# Patient Record
Sex: MALE | Race: BLACK OR AFRICAN AMERICAN | NOT HISPANIC OR LATINO | URBAN - METROPOLITAN AREA
[De-identification: names, ages, dates, MRNs, and addresses within clinical notes are randomized per-mention and may not be internally consistent; named-entity substitution may affect disease eponyms.]

---

## 2018-02-21 ENCOUNTER — AMBULATORY - HEALTHEAST (OUTPATIENT)
Dept: CARDIAC REHAB | Facility: CLINIC | Age: 58
End: 2018-02-21

## 2018-02-21 DIAGNOSIS — Z95.5 S/P CORONARY ARTERY STENT PLACEMENT: ICD-10-CM

## 2018-02-22 ENCOUNTER — AMBULATORY - HEALTHEAST (OUTPATIENT)
Dept: CARDIAC REHAB | Facility: CLINIC | Age: 58
End: 2018-02-22

## 2018-02-22 DIAGNOSIS — Z95.5 S/P CORONARY ARTERY STENT PLACEMENT: ICD-10-CM

## 2018-02-22 RX ORDER — DOXEPIN HYDROCHLORIDE 10 MG/1
10 CAPSULE ORAL AT BEDTIME
Status: SHIPPED | COMMUNITY
Start: 2018-02-22

## 2018-02-22 RX ORDER — HYDROXYZINE HYDROCHLORIDE 25 MG/1
25 TABLET, FILM COATED ORAL 3 TIMES DAILY PRN
Status: SHIPPED | COMMUNITY
Start: 2018-02-22

## 2018-02-22 RX ORDER — ATORVASTATIN CALCIUM 40 MG/1
40 TABLET, FILM COATED ORAL AT BEDTIME
Status: SHIPPED | COMMUNITY
Start: 2018-02-22

## 2018-02-22 RX ORDER — NICOTINE 21 MG/24HR
1 PATCH, TRANSDERMAL 24 HOURS TRANSDERMAL EVERY 24 HOURS
Status: SHIPPED | COMMUNITY
Start: 2018-02-22

## 2018-02-22 RX ORDER — METOPROLOL SUCCINATE 25 MG/1
25 TABLET, EXTENDED RELEASE ORAL DAILY
Status: SHIPPED | COMMUNITY
Start: 2018-02-22

## 2018-02-22 RX ORDER — NITROGLYCERIN 80 MG/1
1 PATCH TRANSDERMAL DAILY
Status: SHIPPED | COMMUNITY
Start: 2018-02-22

## 2018-02-22 RX ORDER — SULFACETAMIDE SODIUM 100 MG/ML
1 SOLUTION/ DROPS OPHTHALMIC
Status: SHIPPED | COMMUNITY
Start: 2018-02-22

## 2018-02-22 RX ORDER — SERTRALINE HYDROCHLORIDE 100 MG/1
100 TABLET, FILM COATED ORAL DAILY
Status: SHIPPED | COMMUNITY
Start: 2018-02-22

## 2018-02-22 RX ORDER — POLYETHYLENE GLYCOL 3350 17 G
2 POWDER IN PACKET (EA) ORAL PRN
Status: SHIPPED | COMMUNITY
Start: 2018-02-22

## 2018-02-22 RX ORDER — ASPIRIN 81 MG/1
81 TABLET, CHEWABLE ORAL DAILY
Status: SHIPPED | COMMUNITY
Start: 2018-02-22

## 2018-02-22 RX ORDER — RISPERIDONE 4 MG/1
4 TABLET ORAL 2 TIMES DAILY
Status: SHIPPED | COMMUNITY
Start: 2018-02-22

## 2018-02-26 ENCOUNTER — AMBULATORY - HEALTHEAST (OUTPATIENT)
Dept: CARDIAC REHAB | Facility: CLINIC | Age: 58
End: 2018-02-26

## 2018-02-26 DIAGNOSIS — Z95.5 S/P CORONARY ARTERY STENT PLACEMENT: ICD-10-CM

## 2018-03-02 ENCOUNTER — AMBULATORY - HEALTHEAST (OUTPATIENT)
Dept: CARDIAC REHAB | Facility: CLINIC | Age: 58
End: 2018-03-02

## 2018-03-02 DIAGNOSIS — Z95.5 S/P CORONARY ARTERY STENT PLACEMENT: ICD-10-CM

## 2018-03-05 ENCOUNTER — AMBULATORY - HEALTHEAST (OUTPATIENT)
Dept: CARDIAC REHAB | Facility: CLINIC | Age: 58
End: 2018-03-05

## 2018-03-05 ENCOUNTER — COMMUNICATION - HEALTHEAST (OUTPATIENT)
Dept: TELEHEALTH | Facility: CLINIC | Age: 58
End: 2018-03-05

## 2018-03-05 DIAGNOSIS — Z95.5 S/P CORONARY ARTERY STENT PLACEMENT: ICD-10-CM

## 2018-03-05 DIAGNOSIS — I21.4 NSTEMI (NON-ST ELEVATED MYOCARDIAL INFARCTION) (H): ICD-10-CM

## 2018-03-07 ENCOUNTER — AMBULATORY - HEALTHEAST (OUTPATIENT)
Dept: CARDIAC REHAB | Facility: CLINIC | Age: 58
End: 2018-03-07

## 2018-03-07 DIAGNOSIS — Z95.5 S/P CORONARY ARTERY STENT PLACEMENT: ICD-10-CM

## 2018-03-07 DIAGNOSIS — I21.4 NSTEMI (NON-ST ELEVATED MYOCARDIAL INFARCTION) (H): ICD-10-CM

## 2018-03-19 ENCOUNTER — AMBULATORY - HEALTHEAST (OUTPATIENT)
Dept: CARDIAC REHAB | Facility: CLINIC | Age: 58
End: 2018-03-19

## 2018-03-19 DIAGNOSIS — Z95.5 S/P CORONARY ARTERY STENT PLACEMENT: ICD-10-CM

## 2018-03-19 DIAGNOSIS — I21.4 NSTEMI (NON-ST ELEVATED MYOCARDIAL INFARCTION) (H): ICD-10-CM

## 2018-03-21 ENCOUNTER — AMBULATORY - HEALTHEAST (OUTPATIENT)
Dept: CARDIAC REHAB | Facility: CLINIC | Age: 58
End: 2018-03-21

## 2018-03-21 DIAGNOSIS — I21.4 NSTEMI (NON-ST ELEVATED MYOCARDIAL INFARCTION) (H): ICD-10-CM

## 2018-03-21 DIAGNOSIS — Z95.5 S/P CORONARY ARTERY STENT PLACEMENT: ICD-10-CM

## 2018-03-26 ENCOUNTER — AMBULATORY - HEALTHEAST (OUTPATIENT)
Dept: CARDIAC REHAB | Facility: CLINIC | Age: 58
End: 2018-03-26

## 2018-03-26 DIAGNOSIS — I21.4 NSTEMI (NON-ST ELEVATED MYOCARDIAL INFARCTION) (H): ICD-10-CM

## 2018-04-30 ENCOUNTER — AMBULATORY - HEALTHEAST (OUTPATIENT)
Dept: CARDIAC REHAB | Facility: CLINIC | Age: 58
End: 2018-04-30

## 2018-04-30 DIAGNOSIS — Z95.5 S/P CORONARY ARTERY STENT PLACEMENT: ICD-10-CM

## 2018-04-30 DIAGNOSIS — I21.4 NSTEMI (NON-ST ELEVATED MYOCARDIAL INFARCTION) (H): ICD-10-CM

## 2018-05-07 ENCOUNTER — AMBULATORY - HEALTHEAST (OUTPATIENT)
Dept: CARDIAC REHAB | Facility: CLINIC | Age: 58
End: 2018-05-07

## 2018-05-07 DIAGNOSIS — I21.4 NSTEMI (NON-ST ELEVATED MYOCARDIAL INFARCTION) (H): ICD-10-CM

## 2018-05-07 LAB — GLUCOSE BLDC GLUCOMTR-MCNC: 89 MG/DL

## 2021-05-31 VITALS — WEIGHT: 181.4 LBS

## 2021-05-31 VITALS — WEIGHT: 185 LBS

## 2021-05-31 VITALS — WEIGHT: 180 LBS

## 2021-05-31 VITALS — WEIGHT: 182 LBS

## 2021-05-31 VITALS — WEIGHT: 177 LBS

## 2021-06-01 VITALS — WEIGHT: 184 LBS

## 2021-06-01 VITALS — WEIGHT: 185 LBS

## 2021-06-01 VITALS — WEIGHT: 181.8 LBS

## 2021-06-01 VITALS — WEIGHT: 183 LBS

## 2021-06-16 NOTE — PROGRESS NOTES
"Cardiac Rehab  Phase II Assessment    Assessment Date: 2/22/2018    Diagnosis: NSTEMI   Date of Onset: 2/2/2018  Procedure: NEREIDA to LAD  Date of Onset: 2/2/2018  ICD/Pacemaker: No  Post-op Complications: denies  ECG History: SR EF%:60%  Past Medical History: paranoid schizophrenia, anxiety, severe recurrent major depressive disorder with psychotic features, GERD, chronic chest pain for the past few months, low back pain, cervical spine pain, asthma (mild and persistent),COPD    Physical Assessment  Precautions/ Physical Limitations: back is bothersome at times, sometimes hips and knees, legs cramp up at night some times  Oxygen: No  O2 Sats: 98% Lung Sounds: inspiratory and expiratory wheezes, lungs clear per Britt Posey RN Edema: none  Incisions: well healed  Sleeping Pattern: fair , \"Sometimes I stay up so that's not good.  My thoughts race and I can't get to sleep.\"  Does report getting at least 4-6 hours consecutive sleep.  Reports typically takes meds at 9pm, goes to sleep at 9:30 and is up at 5 am.  Appetite: fair  Asking about eating cheeseburger.  Wants to find a chip he can eat.  KENNETH Jeffrey, was present and did discuss these specific concerns with the patient.  Nutrition Risk Screen: none    Pain  Location: denies any pain today.  Does report back is the typically the worst.  Characteristics:achy, sore  Current Pain Management: rest and ibuprofen  Intervention: as above  Response: relief    Psychosocial/ Emotional Health  1. In the past 12 months, have you been in a relationship where you have been abused physically, emotionally, sexually or financially? No  notified: NA  2. Who do you turn to for emotional support?: My brother.  3. Do you have cultural or spiritual needs? No  \"You ask a lot of questions.\"  4. Have there been any major life changes in the past 12 months? No    Referral Information  Primary Physician: Chantel James MD  Cardiologist: Is not sure who his cardiologist is.  " "Leigh Barrios MD  Surgeon: N/A    Home exercise/Equipment: No equipment at home. \"I would like to get something though.  Like one of those cycle bikes or arm weights.\"    Patient's long-term goal(s): 1.Getting stronger.                                                   2. Getting able to go outside to take walks, feeling up to doing it    1. Living Accommodations: Home Steps: Yes  (7-8 into building)      Support people at home: none, does have PCA 4 hours per day.  Assists with bathing, meal prep, cleaning   2. Marital Status:   3. Family is not able to assist with cares- most of family lives in [place].  Brother does live nearby but does not assist in cares.      Yazidi/Community involvement: \"Good-you ask a lot of questions.\"  4. Recreation/Hobbies: Playing poker on his phone, wants to go fishing I havent' been fishing in 10 years, watching tv, visiting brother and playing dominoes.          "
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
## 2021-06-16 NOTE — PROGRESS NOTES
ITP ASSESSMENT   Assessment Day: Initial  Session Number: 1 of 18-36  Precautions: standard cardiac, schizophrenia, severe depressive disorder with psychotic features  Diagnosis: MI;Stent  Risk Stratification: High (based on depression, smoking)  Referring Provider: Leigh Barrios,   EXERCISE  Exercise Assessment: Initial     6 Minute Walk Test   Pre   Pre Exercise HR: 62                  Pre Exercise BP: 107/65    Peak  Peak HR: 77                 Peak BP: 142/69  Peak feet: 1000  Peak O2 SAT: 98  Peak RPE: 13  Peak MPH: 1.89    Symptoms:  Peak Symptoms: tired    5 mins. Post  5 Min Post HR: 66  5 Min Post BP: 128/70                         Exercise Plan  Goals Next 30 days  ADL'S: Patient will be able to put away clothes without fatigue or symptoms.  Leisure: Patient will be able to initiate home exercise program to build up strength to resume going to brother's place to play dominoes at least x1 per week.  Work: Patient will be able to tolerate walking through the grocery store without fatigue.              Education Goals: Patient can state cardiac s/s and appropriate emergency response.;Has system for taking medication.;Medication review  Education Goals Met: Has system for taking medication.    Exercise Prescription  Exercise Mode: Bike;Nustep;Arm Erg.;Elliptical  Frequency: 2-3 times per week  Duration: 20-30 minutes  Intensity / THR: 20-30 beats above resting heart rate  RPE 11-14  Progression / Met level: 2-3 MET level  Resistive Training?: Yes (is interested in doing resistive weight training)    Current Exercise (mins/week): 1    Interventions  Home Exercise:  Mode: walking  Frequency: 2-4 times per week  Duration: 20-30 minutes    Education Material : Educational videos;Provide written material;Individual education and counseling;Offer educational classes    Education Completed  Exercise Education Completed: Signs and Symptoms;Emergency Plan;Home Exercise;Benefits of Exercise            Exercise  "Follow-up/Discharge  Follow up/Discharge: Will need reinforcement  NUTRITION  Nutrition Assessment: Initial    Nutrition Risk Factors:  Nutrition Risk Factors: Dyslipidemia  Cholesterol: 146  LDL: 62  HDL: 22  Triglycerides: 309    Nutrition Plan  Interventions    Other Nutrition Intervention: Therapist/Pt Discussion      Education Completed  Nutrition Education Completed: Low sodium diet (asking about what kind of chips he can have )    Goals  Nutrition Goals (Next 30 days): Patient can identify their risk factors for CAD;Patient will maintain current weight or gradual weight gain;Patient will follow a low sodium diet    Goals Met  Nutrition Goals Met: Patient can identify their risk factors for CAD    Height, Weight, and  BMI  Weight: 181 lb 6.4 oz (82.3 kg)  Height: 5' 7\" (1.702 m)  BMI: 28.4    Nutrition Follow-up  Follow-up/Discharge: Reinforce new learning and address questions as arise       Other Risk Factors  Other Risk Factor Assessment: Initial    HTN Risk Factor: Hypertension    Pre Exercise BP: 107/65  Post Exercise BP: 132/64    Hypertension Plan  Goals  HTN Goals: Follow low sodium diet;Take medication as prescribed;Exercises regularly    Goals Met  HTN Goals Met: Take medication as prescribed    HTN Interventions  HTN Interventions: Diet consult;Therapist/patient discussion;Provide written material;Offer educational classes;Offer educational videos    HTN Education Completed  HTN Education Completed: Low sodium diet;Medication review;Low sodium class;Dining out class;Risk factor overview    Tobacco Risk Factor: Tobacco    Initial Use:: 1.5 ppd  Current Use:: none (Reports quit smoking 2/2/2018)    Tobacco Plan  Tobacco Goals  Tobacco Goals: Patient remain tobacco free    Goals Met  Tobacco Goals Met: Patient remain tobacco free    Tobacco Interventions  Tobacco Interventions: Therapist/patient discussion;Provide written material;Offer educational videos;Offer class on risk factor " modification    Tobacco Education Completed  Tobacco Education Completed: Health benefits of tobacco cessation    Risk Factor Follow-up   Follow-up/Discharge: Patient reports he has not had any urges.   PSYCHOSOCIAL  Psychosocial Assessment: Initial     [place] CO[employer] Q of L Summary Score: 24    LAN-D Score: 29    Psychosocial Risk Factor: Stress    Psychosocial Plan  Interventions  If LAN-D > 15 send letter to MD  Interventions: Provide written material;Individual education and counseling    Education Completed  Education Completed: Effects of stress on body;S/S of depression    Goals  Goals (Next 30 days): Identify stressors;Improvement in [employer] score;Oriented to stress management classes;Identified Support system;Practicing stress management skills    Goals Met  Goals Met: Identified Support system    Psychosocial Follow-up  Follow-up/Discharge: Reinforce new learning.           Patient involved in Goal setting?: Yes    Signature: _____________________________________________________________    Date: __________________    Time: __________________

## 2021-06-17 NOTE — PROGRESS NOTES
ITP ASSESSMENT   Assessment Day: 60 Day  Session Number: 9 of 18-36  Precautions: standard cardiac  Diagnosis: MI;Stent  Risk Stratification: High  Referring Provider: Leigh Barrios MBBS  EXERCISE  Exercise Assessment: Reassessment                          Exercise Plan  Goals Next 30 days  ADL'S: Pt will be able to climb 10 stairs without signs or symptoms.  Leisure: Pt will be able to follow through with home exercise program consistently in order to build up strength to play dominoes at his brother's house once a week.  Work: Pt will be able to walk to the grocery store without fatigue.    Education Goals: All goals in this section met  Education Goals Met: Patient can state cardiac s/s and appropriate emergency response.;Has system for taking medication.;Medication review.                        Goals Met  30 day ADL'S goals met: Goal met. Patient is able to put away clothes away without fatigue.  30 day Leisure goals met: Goal not met.  Patient has been absent for the last month. Patient twisted his ankle on some ice and has not fully recovered. Patient also had transportation issues.  Patient has not been able to exercise due to a sore ankle.  30 day Work goals met: Goal not met. Pt has not walked to the grocery store due to sore ankle and inclement weather.  30 Day Progression: Patient reports that he feels that his exercise tolerance has decreased since he has not been exercising at home or attended cardiac rehab for the last month. He is exercising at a MET level of 3.7 for 45 minutes on the recumbent elliptcal. Pt continues to not smoke and continue to not add salt to his foods.    Initial ADL's goals met: Patient is able to put away his clothes but is still feeling more fatigue than usual.  Will keep goal the same.  Initial Leisure goals met: Patient has initiated a home exrcise program of walking with goal to walk to brothers.  He is walking 30 minutes in the halls of his apartment but not consistently  yet.  Has been bothered by the stomach flu which interrupted his progress.  Goal will remain the same.  Intial Work goals met: Patient has not walked to the grocery store yet.  Reports he has walked around the block in prep for trip to grocery store.  Goal will remain the same.  Initial Progression: Patient reports he feels stronger and is tolerating exercise 20-48 minutes in 2.5-3.8 MET level while in cardiac rehab.  Home exercise program follow through has been inconsistent and will work on this daily.  Has not smoked since hospitalization.  Has made a few dietary changes in that is not eating salty chips as much.    Exercise Prescription  Exercise Mode: Bike;Nustep;Elliptical  Frequency: 2-3 times per week  Duration: 30-45 minutes  Intensity / THR: 20-30 beats above resting heart rate  RPE 11-14  Progression / Met level: 3.5-4.5 METs  Resistive Training?: Yes (would like to start them soon)    Current Exercise (mins/week): 45    Interventions  Home Exercise:  Mode: walking  Frequency: 2-4 times per week  Duration: 20-30 minutes    Education Material : Educational videos;Provide written material;Individual education and counseling;Offer educational classes    Education Completed  Exercise Education Completed: Signs and Symptoms;Emergency Plan;Home Exercise;Benefits of Exercise            Exercise Follow-up/Discharge  Follow up/Discharge: Pt has not been exercising at home due to a sore ankle from twisting ankle on the ice. Encouraged patient to start walking 20-30 minutes 2-4 times per week.  NUTRITION  Nutrition Assessment: Reassessment    Nutrition Risk Factors:  Nutrition Risk Factors: Dyslipidemia  Cholesterol: 146  LDL: 62  HDL: 22  Triglycerides: 309    Nutrition Plan  Interventions  Other Nutrition Intervention: Therapist/Pt Discussion;Educational Videos;Provide with Written Material;Diet Class    Education Completed  Nutrition Education Completed: Low sodium diet    Goals  Nutrition Goals (Next 30 days):  "Patient can identify their risk factors for CAD;Patient will follow a low sodium diet;Provide Rate your Plate Survey;Review Dietitian schedule    Goals Met  Nutrition Goals Met: Patient can identify their risk factors for CAD    Height, Weight, and  BMI  Weight: 185 lb (83.9 kg)  Height: 5' 7\" (1.702 m)  BMI: 28.97    Nutrition Follow-up  Follow-up/Discharge: Reinforce new learning and encourage follow through with dietary changes.  Encourage scheduling diet consult.       Other Risk Factors  Other Risk Factor Assessment: Reassessment    HTN Risk Factor: Hypertension    Pre Exercise BP: 118/68  Post Exercise BP: 110/60    Hypertension Plan  Goals  HTN Goals: Follow low sodium diet;Take medication as prescribed    Goals Met  HTN Goals Met: Take medication as prescribed;Follow low sodium diet    HTN Interventions  HTN Interventions: Diet consult;Therapist/patient discussion;Provide written material;Offer educational videos;Offer educational classes    HTN Education Completed  HTN Education Completed: Low sodium diet;Medication review;Low sodium class    Tobacco Risk Factor: Tobacco    Initial Use:: 1.5 ppd  Current Use:: none    Tobacco Plan  Tobacco Goals  Tobacco Goals: Patient remain tobacco free    Goals Met  Tobacco Goals Met: Patient remain tobacco free    Tobacco Interventions  Tobacco Interventions: Therapist/patient discussion;Provide written material;Offer educational videos;Offer class on risk factor modification    Tobacco Education Completed  Tobacco Education Completed: Health benefits of tobacco cessation    Risk Factor Follow-up   Follow-up/Discharge: Pt reports that he has not smoked since 2/2/18.   PSYCHOSOCIAL  Psychosocial Assessment: Reassessment       Psychosocial Risk Factor: Stress    Psychosocial Plan  Interventions  Interventions: Provide written material;Individual education and counseling;Offer educational videos and classes    Education Completed  Education Completed: S/S of " depression;Effects of stress on body    Goals  Goals (Next 30 days): Improvement in [employer] score;Practicing stress management skills    Goals Met  Goals Met: Identified Support system;Oriented to stress management classes;Identify stressors    Psychosocial Follow-up  Follow-up/Discharge: Pt has a new . Patient states that things are going well.           Patient involved in Goal setting?: Yes    Signature: _____________________________________________________________    Date: __________________    Time: __________________

## 2021-06-18 NOTE — PROGRESS NOTES
ITP ASSESSMENT   Assessment Day: 90 Day  Session Number: 10 of 18-36  Precautions: standard cardiac  Diagnosis: MI;Stent  Risk Stratification: High  Referring Provider: Leigh Barrios MBBS  EXERCISE  Exercise Assessment: Discharge     6 Minute Walk Test                            Exercise Plan    Education Goals: All goals in this section met                          Goals Met  60 day ADL'S goals met: Goal not met as patient did not return.  60 day Leisure goals met: Patient has not followed through consistently with home exercise or participation in Phase II program.  Goal not met.  60 day Work goals met: Goal not met.  60 Day Progression: Patient only participated in one session since last ITP completed.  Progress interrupted by hospitalization for chest pain with angio's. Called patient on plans for resuming and despite having a target date x 3 did not show up as reported.  Left message to state patient will be discharged due to inconsistent participation.   Deena also made aware.    30 day ADL'S goals met: Goal met. Patient is able to put away clothes away without fatigue.  30 day Leisure goals met: Goal not met.  Patient has been absent for the last month. Patient twisted his ankle on some ice and has not fully recovered. Patient also had transportation issues.  Patient has not been able to exercise due to a sore ankle.  30 day Work goals met: Goal not met. Pt has not walked to the grocery store due to sore ankle and inclement weather.  30 Day Progression: Patient reports that he feels that his exercise tolerance has decreased since he has not been exercising at home or attended cardiac rehab for the last month. He is exercising at a MET level of 3.7 for 45 minutes on the recumbent elliptcal. Pt continues to not smoke and continue to not add salt to his foods.    Initial ADL's goals met: Patient is able to put away his clothes but is still feeling more fatigue than usual.  Will keep goal the  same.  Initial Leisure goals met: Patient has initiated a home exrcise program of walking with goal to walk to [facility].  He is walking 30 minutes in the halls of his apartment but not consistently yet.  Has been bothered by the stomach flu which interrupted his progress.  Goal will remain the same.  Intial Work goals met: Patient has not walked to the grocery store yet.  Reports he has walked around the block in prep for trip to grocery store.  Goal will remain the same.  Initial Progression: Patient reports he feels stronger and is tolerating exercise 20-48 minutes in 2.5-3.8 MET level while in cardiac rehab.  Home exercise program follow through has been inconsistent and will work on this daily.  Has not smoked since hospitalization.  Has made a few dietary changes in that is not eating salty chips as much.    Exercise Prescription          RPE 11-14    Resistive Training?: Yes (would like to start them soon)    Current Exercise (mins/week): 24    Interventions  Home Exercise:  Mode: walking  Frequency: daily  Duration: 20-40 minites    Education Material : Educational videos;Provide written material;Individual education and counseling;Offer educational classes    Education Completed  Exercise Education Completed: Cardiac Anatomy;Emergency Plan;Benefits of Exercise;Home Exercise            Exercise Follow-up/Discharge  Follow up/Discharge: Patient has not been participating in Phase II since 5/7/2018 and unable to determine follow through with home exercise program.   Patient's progress was interrupted by hospitalization for chest pain with angio's NUTRITION  Nutrition Assessment: Discharge    Nutrition Risk Factors:  Nutrition Risk Factors: Dyslipidemia  Cholesterol: 146  LDL: 62  HDL: 22  Triglycerides: 309    Nutrition Plan  Interventions    Other Nutrition Intervention: Therapist/Pt Discussion;Educational Videos;Provide with Written Material;Referral to DM education        Education Completed  Nutrition  "Education Completed: Low sodium diet    Goals  Nutrition Goals (Next 30 days): Patient can identify their risk factors for CAD;Patient will follow a low sodium diet    Goals Met  Nutrition Goals Met: Patient can identify their risk factors for CAD    Height, Weight, and  BMI  Weight: 185 lb (83.9 kg)  Height: 5' 7\" (1.702 m)  BMI: 28.97    Nutrition Follow-up  Follow-up/Discharge: No new teaching done due to inconsistent performance.       Other Risk Factors  Other Risk Factor Assessment: Discharge    HTN Risk Factor: Hypertension    Pre Exercise BP: 110/62  Post Exercise BP: 118/64    Hypertension Plan  Goals  HTN Goals: Follow low sodium diet;Take medication as prescribed    Goals Met  HTN Goals Met: Follow low sodium diet;Take medication as prescribed    HTN Interventions  HTN Interventions: Diet consult;Therapist/patient discussion;Provide written material;Offer educational videos;Offer educational classes    HTN Education Completed  HTN Education Completed: Low sodium diet;Medication review;Risk factor overview    Tobacco Risk Factor: Tobacco    Initial Use:: 1.5 ppd  Current Use:: none        Goals Met  Tobacco Goals Met: Patient remain tobacco free    Tobacco Interventions  Tobacco Interventions: Therapist/patient discussion;Provide written material    Tobacco Education Completed  Tobacco Education Completed: Identifies triggers;Health benefits of tobacco cessation;Risk factor overview    Risk Factor Follow-up   Follow-up/Discharge: Unable to assess follow through with   PSYCHOSOCIAL  Psychosocial Assessment: Discharge             Psychosocial Risk Factor: Stress    Psychosocial Plan  Interventions  Interventions: Provide written material;Offer educational videos and classes;Individual education and counseling    Education Completed  Education Completed: Relaxation/Coping Techniques;S/S of depression;Effects of stress on body    Goals  No Data Recorded    Goals Met  Goals Met: Identified Support " system;Oriented to stress management classes;Identify stressors    Psychosocial Follow-up  Follow-up/Discharge: Message left with  Deena regarding progress and discharge.           Patient involved in Goal setting?: Yes    Signature: _____________________________________________________________    Date: __________________    Time: __________________